# Patient Record
Sex: FEMALE | Race: WHITE | Employment: FULL TIME | URBAN - METROPOLITAN AREA
[De-identification: names, ages, dates, MRNs, and addresses within clinical notes are randomized per-mention and may not be internally consistent; named-entity substitution may affect disease eponyms.]

---

## 2017-07-20 NOTE — PROGRESS NOTES
SUBJECTIVE:                                                    Padmaja Gao is a 41 year old female who presents to clinic today for the following health issues:    Sore Throat      Duration: 2 weeks    Description  Right sided throat pain, ear pain, nasal congestion, nasal drainage at times, eye is watering on right side     Severity: moderate    Accompanying signs and symptoms:     History (predisposing factors):  Had strep about 1 yr ago  - per pt this feels different     Precipitating or alleviating factors: None    Therapies tried and outcome:  Allegra - helped.   Right nasal congestion.     Problem list and histories reviewed & adjusted, as indicated.  Additional history: as documented    Patient Active Problem List   Diagnosis     Morbid obesity (H)     Dyspnea and respiratory abnormality     Esophageal reflux     Low back pain     Intertrigo     KARL (obstructive sleep apnea)-mild (AHI 7)     Past Surgical History:   Procedure Laterality Date     EXTRACORPOREAL SHOCK WAVE LITHOTRIPSY (ESWL)       LAPAROSCOPIC GASTRIC SLEEVE  12/5/2013    Procedure: LAPAROSCOPIC GASTRIC SLEEVE;  LAPAROSCOPIC SLEEVE GASTRECTOMY ;  Surgeon: Roni Pina MD;  Location:  OR     LITHOTRIPSY       [place] [address]       Social History   Substance Use Topics     Smoking status: Never Smoker     Smokeless tobacco: Never Used     Alcohol use Yes     Family History   Problem Relation Age of Onset     Other - See Comments Paternal Grandmother      KARL     Hypertension Father          Current Outpatient Prescriptions   Medication Sig Dispense Refill     amoxicillin (AMOXIL) 500 MG capsule Take 2 capsules (1,000 mg) by mouth 3 times daily 60 capsule 0     levonorgestrel (MIRENA) 20 MCG/24HR IUD 1 each by Intrauterine route once.       CYANOCOBALAMIN PO Take 1,000 mcg by mouth daily       cyanocobalamin (VITAMIN B12) 1000 MCG/ML injection Inject 1 mL (1,000 mcg) Subcutaneous every 30 days (Patient not taking: Reported on  "7/21/2017) 3 mL 3     ferrous fumarate 65 mg, [place]. FE,-Vitamin C 125 mg (VITRON C)  MG TABS Take 1 tablet by mouth daily (Patient not taking: Reported on 7/21/2017) 90 tablet 3     Multiple Vitamins-Iron (QC DAILY MULTIVITAMINS/IRON) TABS Take 2 tablets by mouth daily (Patient not taking: Reported on 7/21/2017) 180 tablet 3     Acetaminophen (TYLENOL PO) Take 650 mg by mouth       Iron-Vitamin C (VITRON-C PO) Take 1 tablet by mouth        Cholecalciferol (VITAMIN D) 2000 UNITS CAPS Take 1 tablet by mouth daily       syringe/needle, disp, 25G X 1\" 3 ML MISC Use for B-12 injection (Patient not taking: Reported on 7/21/2017) 3 each 3     cyanocobalamin 1000 MCG/ML injection Inject 1 mL (1,000 mcg) Subcutaneous every 30 days (Patient not taking: Reported on 7/21/2017) 3 mL 3     Calcium-Vitamin D-Vitamin K (CALCIUM SOFT CHEWS PO) Take 600 mg by mouth 2 times daily (before meals)       Fexofenadine HCl (ALLEGRA ALLERGY PO) Take 180 mg by mouth daily       Allergies   Allergen Reactions     Sulfa Drugs Other (See Comments)     Unknown          ROS:  Constitutional, HEENT, cardiovascular, pulmonary, gi and gu systems are negative, except as otherwise noted.      OBJECTIVE:   /67  Pulse 64  Temp 98.3  F (36.8  C) (Oral)  Wt 233 lb (105.7 kg)  SpO2 99%  BMI 38.77 kg/m2  Body mass index is 38.77 kg/(m^2).  GENERAL: healthy, alert and no distress  Head: Normocephalic, atraumatic.  Eyes: Conjunctiva clear, non icteric. PERRLA.  Ears: External ears and TMs normal BL.  Nasal congestion with posterior nasal drainage. Right maxillary tenderness.   Mouth / Throat: Normal dentition.  No oral lesions. Pharynx non erythematous, tonsils without hypertrophy.  Neck: Supple, no enlarged LN, trachea midline.   RESP: lungs clear to auscultation - no rales, rhonchi or wheezes  CV: regular rate and rhythm, normal S1 S2, no S3 or S4, no murmur, click or rub, no peripheral edema     Diagnostic Test Results:  none "     ASSESSMENT/PLAN:       ICD-10-CM    1. Other sinusitis J32.9 amoxicillin (AMOXIL) 500 MG capsule   Warning signs discussed.  side effects discussed  Symptomatic treatment: such as fluids,  OTC acetaminophen and /or non-steroidal anti-inflammatory medication.  Follow up  1-2 wks as needed     Esa Holley PA-C  [facility]

## 2017-07-21 ENCOUNTER — OFFICE VISIT (OUTPATIENT)
Dept: FAMILY MEDICINE | Facility: CLINIC | Age: 42
End: 2017-07-21
Payer: COMMERCIAL

## 2017-07-21 VITALS
HEART RATE: 64 BPM | DIASTOLIC BLOOD PRESSURE: 67 MMHG | TEMPERATURE: 98.3 F | OXYGEN SATURATION: 99 % | BODY MASS INDEX: 38.77 KG/M2 | WEIGHT: 233 LBS | SYSTOLIC BLOOD PRESSURE: 102 MMHG

## 2017-07-21 DIAGNOSIS — J32.9 OTHER SINUSITIS: Primary | ICD-10-CM

## 2017-07-21 PROCEDURE — 99213 OFFICE O/P EST LOW 20 MIN: CPT | Performed by: PHYSICIAN ASSISTANT

## 2017-07-21 RX ORDER — AMOXICILLIN 500 MG/1
1000 CAPSULE ORAL 3 TIMES DAILY
Qty: 60 CAPSULE | Refills: 0 | Status: SHIPPED | OUTPATIENT
Start: 2017-07-21 | End: 2018-03-06

## 2017-07-21 NOTE — MR AVS SNAPSHOT
"              After Visit Summary   2017    Padmaja Gao    MRN: [id number]           Patient Information     Date Of Birth          1975        Visit Information        Provider Department      2017 9:20 AM Esa Holley PA-C [facility]        Today's Diagnoses     Other sinusitis    -  1       Follow-ups after your visit        Who to contact     If you have questions or need follow up information about today's clinic visit or your schedule please contact [facility] directly at [phone].  Normal or non-critical lab and imaging results will be communicated to you by Zeerhart, letter or phone within 4 business days after the clinic has received the results. If you do not hear from us within 7 days, please contact the clinic through Zeerhart or phone. If you have a critical or abnormal lab result, we will notify you by phone as soon as possible.  Submit refill requests through Occlutech or call your pharmacy and they will forward the refill request to us. Please allow 3 business days for your refill to be completed.          Additional Information About Your Visit        MyChart Information     Occlutech lets you send messages to your doctor, view your test results, renew your prescriptions, schedule appointments and more. To sign up, go to [email or web address] . Click on \"Log in\" on the left side of the screen, which will take you to the Welcome page. Then click on \"Sign up Now\" on the right side of the page.     You will be asked to enter the access code listed below, as well as some personal information. Please follow the directions to create your username and password.     Your access code is: [email or web address]  Expires: 10/19/2017  9:34 AM     Your access code will  in 90 days. If you need help or a new code, please call your [facility] or [phone].        Care EveryWhere ID     This is your Care EveryWhere ID. This could be used by other " organizations to access your [facility] medical records  [id number]        Your Vitals Were     Pulse Temperature Pulse Oximetry BMI (Body Mass Index)          64 98.3  F (36.8  C) (Oral) 99% 38.77 kg/m2         Blood Pressure from Last 3 Encounters:   07/21/17 102/67   12/11/15 113/72   12/21/14 116/70    Weight from Last 3 Encounters:   07/21/17 233 lb (105.7 kg)   12/11/15 201 lb 14.4 oz (91.6 kg)   12/21/14 201 lb (91.2 kg)              Today, you had the following     No orders found for display         Today's Medication Changes          These changes are accurate as of: 7/21/17  9:34 AM.  If you have any questions, ask your nurse or doctor.               Start taking these medicines.        Dose/Directions    amoxicillin 500 MG capsule   Commonly known as:  AMOXIL   Used for:  Other sinusitis   Started by:  Esa Holley PA-C        Dose:  1000 mg   Take 2 capsules (1,000 mg) by mouth 3 times daily   Quantity:  60 capsule   Refills:  0            Where to get your medicines      These medications were sent to [facility] [place] - [address] [place] [address]  [address] [place] [address], [place] [address]     Phone:  [phone]     amoxicillin 500 MG capsule                Primary Care Provider Office Phone # Fax #    [facility] [phone]       [address] [place] [address]        Equal Access to Services     TANNER WOODALL AH: Monster veronicao Sozuri, waaxda luqadaha, qaybta kaalmada brigid, waxjin vern turner ana luisaaustin cmnamara. So [facility] [phone].    ATENCIÓN: Si habla español, tiene a vu disposición servicios gratuitos de asistencia lingüística. Kim al [phone].    We comply with applicable federal civil rights laws and [place] laws. We do not discriminate on the basis of race, color, national origin, age, disability sex, sexual orientation or gender identity.            Thank you!     Thank you for choosing  "[facility]  for your care. Our goal is always to provide you with excellent care. Hearing back from our patients is one way we can continue to improve our services. Please take a few minutes to complete the written survey that you may receive in the mail after your visit with us. Thank you!             Your Updated Medication List - Protect others around you: Learn how to safely use, store and throw away your medicines at www.disposemymeds.org.          This list is accurate as of: 7/21/17  9:34 AM.  Always use your most recent med list.                   Brand Name Dispense Instructions for use Diagnosis    ALLEGRA ALLERGY PO      Take 180 mg by mouth daily        amoxicillin 500 MG capsule    AMOXIL    60 capsule    Take 2 capsules (1,000 mg) by mouth 3 times daily    Other sinusitis       CALCIUM SOFT CHEWS PO      Take 600 mg by mouth 2 times daily (before meals)        * CYANOCOBALAMIN PO      Take 1,000 mcg by mouth daily    Bariatric surgery status, Obesity (BMI 30-39.9)       * cyanocobalamin 1000 MCG/ML injection    VITAMIN B12    3 mL    Inject 1 mL (1,000 mcg) Subcutaneous every 30 days    Other and unspecified postsurgical nonabsorption       * cyanocobalamin 1000 MCG/ML injection    VITAMIN B12    3 mL    Inject 1 mL (1,000 mcg) Subcutaneous every 30 days    Bariatric surgery status, Postsurgical nonabsorption       MIRENA (52 MG) 20 MCG/24HR IUD   Generic drug:  levonorgestrel      1 each by Intrauterine route once.        QC DAILY MULTIVITAMINS/IRON Tabs     180 tablet    Take 2 tablets by mouth daily    Bariatric surgery status, Postsurgical nonabsorption       syringe/needle (disp) 25G X 1\" 3 ML Misc     3 each    Use for B-12 injection    Other and unspecified postsurgical nonabsorption       TYLENOL PO      Take 650 mg by mouth        vitamin D 2000 UNITS Caps      Take 1 tablet by mouth daily        * VITRON-C PO      Take 1 tablet by mouth        * ferrous fumarate 65 mg ([place]. " FE)-Vitamin C 125 mg  MG Tabs tablet    VITRON C    90 tablet    Take 1 tablet by mouth daily    Bariatric surgery status, Postsurgical nonabsorption       * Notice:  This list has 5 medication(s) that are the same as other medications prescribed for you. Read the directions carefully, and ask your doctor or other care provider to review them with you.

## 2017-07-21 NOTE — NURSING NOTE
"Chief Complaint   Patient presents with     Pharyngitis       Initial /67  Pulse 64  Temp 98.3  F (36.8  C) (Oral)  Wt 233 lb (105.7 kg)  SpO2 99%  BMI 38.77 kg/m2 Estimated body mass index is 38.77 kg/(m^2) as calculated from the following:    Height as of 12/21/14: 5' 5\" (1.651 m).    Weight as of this encounter: 233 lb (105.7 kg).  Medication Reconciliation: complete  Chaya Ponce CMA    "

## 2017-11-17 ENCOUNTER — RADIANT APPOINTMENT (OUTPATIENT)
Dept: GENERAL RADIOLOGY | Facility: CLINIC | Age: 42
End: 2017-11-17
Attending: PODIATRIST
Payer: COMMERCIAL

## 2017-11-17 ENCOUNTER — OFFICE VISIT (OUTPATIENT)
Dept: PODIATRY | Facility: CLINIC | Age: 42
End: 2017-11-17
Payer: COMMERCIAL

## 2017-11-17 VITALS — HEIGHT: 66 IN | WEIGHT: 234 LBS | TEMPERATURE: 97.6 F | BODY MASS INDEX: 37.61 KG/M2

## 2017-11-17 DIAGNOSIS — M12.571 TRAUMATIC ARTHRITIS OF RIGHT FOOT: ICD-10-CM

## 2017-11-17 DIAGNOSIS — M72.2 PLANTAR FASCIAL FIBROMATOSIS: Primary | ICD-10-CM

## 2017-11-17 PROCEDURE — 73630 X-RAY EXAM OF FOOT: CPT | Mod: TC

## 2017-11-17 PROCEDURE — 99204 OFFICE O/P NEW MOD 45 MIN: CPT | Performed by: PODIATRIST

## 2017-11-17 PROCEDURE — 73650 X-RAY EXAM OF HEEL: CPT | Mod: TC

## 2017-11-17 RX ORDER — METHYLPREDNISOLONE 4 MG
TABLET, DOSE PACK ORAL
Qty: 21 TABLET | Refills: 0 | Status: SHIPPED | OUTPATIENT
Start: 2017-11-17 | End: 2018-01-02

## 2017-11-17 RX ORDER — DICLOFENAC SODIUM 75 MG/1
75 TABLET, DELAYED RELEASE ORAL 2 TIMES DAILY
Qty: 60 TABLET | Refills: 1 | Status: SHIPPED | OUTPATIENT
Start: 2017-11-17 | End: 2017-11-17

## 2017-11-17 ASSESSMENT — PAIN SCALES - GENERAL: PAINLEVEL: MODERATE PAIN (5)

## 2017-11-17 NOTE — PATIENT INSTRUCTIONS
PLANTAR FASCIITIS  The  plantar fascia  is a thick fibrous layer of tissue that covers the bones on the bottom of your foot. It supports the foot bones in an arched position.  Plantar fasciitis  is a painful inflammation of the plantar fascia due to overuse. This can develop gradually or suddenly. It usually affects one foot at a time but can affect both feet. Heel pain can be sharp and feel like a knife sticking in the bottom of your foot. Pain may occur after exercising, long distance jogging, stair climbing, long periods of standing, or after getting up from a seated position.  Risk factors include arthritis, diabetes, obesity or recent weight gain, flat-foot, high arch, wearing high heels or loose shoes or shoes with poor arch support.  Sudden changes in activity or shoe gear may contribute to symptoms.  Foot pain from this condition is usually worse in the morning and improves with walking. By the end of the day there may be a dull aching. Treatment requires improved support of feet, short-term rest and controlling inflammation. It may take up to nine months before all symptoms go away with the measures described below.  A steroid injection into the foot, or surgery may be needed if this is becomes long standing or severe.  HOME CARE  1. If you are overweight, lose weight to promote healing.  2. Choose supportive shoes (stiff through the shank) with good arch support and shock absorbency. Replace athletic shoes when they become worn out. Don t walk or run barefoot.  3. Shoe inserts are an important part of treatment. You can buy off-the-shelf shoe inserts inexpensively such as 20linest.  The best ones are custom molded to your foot with a prescription.  4. Night splints keep the plantar fascia gently stretched while you sleep and will eliminate morning pain. Wear it ALL NIGHT EVERY NIGHT, or any time you sit for a long time.  5. Reduce by 10% or more the activities that stress the feet: jogging, prolonged  standing or walking, high impact sports, etc.  6. Stretch your feet. Gently flex your ankle by leaning into a wall or counter or drop your heel from a step.  Stretch two minutes of every hour you are awake.  7. Icing or massage may help heel pain. Apply an ice pack or frozen water or Coke bottle to the heel for 10-20 minutes as a preventive or after an acute flare of symptoms. You may repeat this as needed.   Follow up with your Doctor in 3 weeks as instructed.  .    Reliable shoe stores: To maximize your experience and provide the best possible fit.  Be sure to show them your foot concerns and tell them Dr. Gallego sent you.      Stores listed in bold have only athletic shoes, and stores that are not bold are mostly casual or variety of shoes    [employer]  [address] [place] [address]  [phone]     [facility]  [address] [place] [address]  [phone]     [employer] [place]  [address] [place] [address]  [phone]    [employer]  [address] [facility] [address]  [phone]    [employer]  [address] [place], [place] [address] [phone]    [employer]  [address] [place] [address]  [phone]                         [employer] Locations:     [address] [place] [address]   [phone]     [address] [place] [address]   [phone]     [address] [place] [address]   [phone]     [address] [place] [address]   [phone]     [address] [place] [address]   [phone]     [address] [place] [address]   [phone]     [address] [address]   [place] [address]   [phone]     [address]   [place], [place] [id number] [phone]             How to find reasonable shoes          The correct width    Correct Fitting    Correct Length      Foot Distortion    Posture Distortion                           Torsional Rigidity      Grasp behind the heel and underneath the foot and twist      Bad    Excessive torsion/twist in midfoot     Less torsion/twist in midfoot is better                   Heel Counter Rigidity      Grasp just above   midsole and squeeze      Bad    Soft heel counter      Good    Rigid Heel Counter      Flexion Rigidity      Grasp shoe and bend from forefoot to rearfoot

## 2017-11-17 NOTE — NURSING NOTE
"Chief Complaint   Patient presents with     Consult     Left heel pain, onset Sept 2017, dorsal Right foot pain due to injury as a teenager; new pt       Initial Temp 97.6  F (36.4  C) (Temporal)  Ht 5' 6\" (1.676 m)  Wt 234 lb (106.1 kg)  BMI 37.77 kg/m2 Estimated body mass index is 37.77 kg/(m^2) as calculated from the following:    Height as of this encounter: 5' 6\" (1.676 m).    Weight as of this encounter: 234 lb (106.1 kg).  BP completed using cuff size: NA (Not Taken)  Medication Reconciliation: complete    Aby Valentine CMA, November 17, 2017    "

## 2017-11-17 NOTE — PROGRESS NOTES
"HPI:  Padmaja Gao is a 42 year old female who is seen in consultation at the request of self.    Pt presents for eval of:   (Onset, Location, L/R, Character, Treatments, Injury if yes)    XR Right foot and Left calcaneal today, 11/17/2017 Sept 2017 plantar Left heel pain. About a year ago Right heel but this is intermittent now.  Sharp, stabbing, burning, shooting, throbbing, tightness after lying or sitting, pain 5-10  Right dorsal foot pain since fracture when patient was a teenager.  Massage, ice, stretching, OTC inserts    Works at [employer], .    Weight management plan: Patient was referred to their PCP to discuss a diet and exercise plan.     ROS: 10 point ROS neg other than the symptoms noted above in the HPI.    PAST MEDICAL HISTORY:   Past Medical History:   Diagnosis Date     Cholelithiasis      GERD (gastroesophageal reflux disease)      Nephrolithiasis      Obesity      KARL (obstructive sleep apnea)-mild (AHI 7) 7/15/2013    Indications for Polysomnogram 7/10/2013: The patient is a 37 y year old Female who is 5' 5\" and weighs 277.1 lbs.  Her BMI equals 46.2, [place] sleepiness scale 15.0. A full night polysomnogram was performed to evaluate for excessive sleepiness, snoring, and possible KARL  Polysomnogram Data:  A full night polysomnogram recorded the standard physiologic parameters including EEG, EOG, EMG, EKG, nasa     Seasonal allergies      PAST SURGICAL HISTORY:   Past Surgical History:   Procedure Laterality Date     EXTRACORPOREAL SHOCK WAVE LITHOTRIPSY (ESWL)       LAPAROSCOPIC GASTRIC SLEEVE  12/5/2013    Procedure: LAPAROSCOPIC GASTRIC SLEEVE;  LAPAROSCOPIC SLEEVE GASTRECTOMY ;  Surgeon: Roni Pina MD;  Location:  OR     LITHOTRIPSY       [facility]      x1     MEDICATIONS:   Current Outpatient Prescriptions:      methylPREDNISolone (MEDROL DOSEPAK) 4 MG tablet, follow package directions, Disp: 21 tablet, Rfl: 0     levonorgestrel (MIRENA) 20 " "MCG/24HR IUD, 1 each by Intrauterine route once., Disp: , Rfl:      amoxicillin (AMOXIL) 500 MG capsule, Take 2 capsules (1,000 mg) by mouth 3 times daily, Disp: 60 capsule, Rfl: 0     CYANOCOBALAMIN PO, Take 1,000 mcg by mouth daily, Disp: , Rfl:      cyanocobalamin (VITAMIN B12) 1000 MCG/ML injection, Inject 1 mL (1,000 mcg) Subcutaneous every 30 days (Patient not taking: Reported on 7/21/2017), Disp: 3 mL, Rfl: 3     ferrous fumarate 65 mg, [place]. FE,-Vitamin C 125 mg (VITRON C)  MG TABS, Take 1 tablet by mouth daily (Patient not taking: Reported on 7/21/2017), Disp: 90 tablet, Rfl: 3     Multiple Vitamins-Iron (QC DAILY MULTIVITAMINS/IRON) TABS, Take 2 tablets by mouth daily (Patient not taking: Reported on 7/21/2017), Disp: 180 tablet, Rfl: 3     Acetaminophen (TYLENOL PO), Take 650 mg by mouth, Disp: , Rfl:      Iron-Vitamin C (VITRON-C PO), Take 1 tablet by mouth , Disp: , Rfl:      Cholecalciferol (VITAMIN D) 2000 UNITS CAPS, Take 1 tablet by mouth daily, Disp: , Rfl:      syringe/needle, disp, 25G X 1\" 3 ML MISC, Use for B-12 injection (Patient not taking: Reported on 7/21/2017), Disp: 3 each, Rfl: 3     cyanocobalamin 1000 MCG/ML injection, Inject 1 mL (1,000 mcg) Subcutaneous every 30 days (Patient not taking: Reported on 7/21/2017), Disp: 3 mL, Rfl: 3     Calcium-Vitamin D-Vitamin K (CALCIUM SOFT CHEWS PO), Take 600 mg by mouth 2 times daily (before meals), Disp: , Rfl:      Fexofenadine HCl (ALLEGRA ALLERGY PO), Take 180 mg by mouth daily, Disp: , Rfl:   ALLERGIES:    Allergies   Allergen Reactions     Sulfa Drugs Other (See Comments)     Unknown      SOCIAL HISTORY:   Social History     Social History     Marital status:      Spouse name: N/A     Number of children: N/A     Years of education: N/A     Occupational History     Not on file.     Social History Main Topics     Smoking status: Never Smoker     Smokeless tobacco: Never Used     Alcohol use Yes     Drug use: No     Sexual " "activity: Yes     Partners: Male     Other Topics Concern     Caffeine Concern No     stopped secondary top surgery      Sleep Concern Yes     Weight Concern Yes     lap band      Social History Narrative     FAMILY HISTORY:   Family History   Problem Relation Age of Onset     Other - See Comments Paternal Grandmother      KARL     Hypertension Father        EXAM:Vitals: Temp 97.6  F (36.4  C) (Temporal)  Ht 5' 6\" (1.676 m)  Wt 234 lb (106.1 kg)  BMI 37.77 kg/m2  BMI= Body mass index is 37.77 kg/(m^2).    General appearance: Patient is alert and fully cooperative with history & exam.  No sign of distress is noted during the visit.     Psychiatric: Affect is pleasant & appropriate.  Patient appears motivated to improve health.     Respiratory: Breathing is regular & unlabored while sitting.     HEENT: Hearing is intact to spoken word.  Speech is clear.  No gross evidence of visual impairment that would impact ambulation.     Vascular: DP & PT 2/4 & regular bilaterally.  No significant edema, rubor or varicosities noted.  CFT and skin temperature is normal to both lower extremities.       Neurologic: Lower extremity sensation is intact to light touch.  No evidence of weakness in the lower extremities.  No evidence of neuropathy and negative tinel sign.     Dermatologic: Skin is intact to both lower extremities without significant lesions, rash or abrasion.  Normal texture turgor and tone. No paronychia or evidence of soft tissue infection is noted.    Musculoskeletal: Patient is ambulatory without assistive device or brace. Pain is noted with firm palpation along the medial band of the plantar fascia bilateral foot most notably at the origination upon the calcaneus not through the arch.  No pain with compression of the calcaneus medial to lateral or with palpation of the achilles, peroneal or posterior tibial tendons.  Slightly more than 0  of ankle joint dorsiflexion without crepitus or pain throughout the ankle, " subtalar or midtarsal joints.  No pain or limitations throughout manual muscle strength testing plus 5/5 to all four quadrants bilateral.  No palpable edema noted.      Also a bulky prominence noted over the dorsal first metatarsocuneiform joint right foot limited motion and crepitus through range of motion through the metatarsal tarsal joints on the right foot.    Radiographs:  Weight bearing. Osteophyte noted about the plantar calcaneus consistent with plantar fasciitis. Diminished calcaneal inclination angle consistent with pes valgus. Also joint space narrowing about the metatarsal tarsal joints in the right foot.     ASSESSMENT:       ICD-10-CM    1. Plantar fascial fibromatosis M72.2 XR Calcaneus Left G/E 2 Views     ORTHOTICS REFERRAL     methylPREDNISolone (MEDROL DOSEPAK) 4 MG tablet     DISCONTINUED: diclofenac (VOLTAREN) 75 MG EC tablet   2. 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      Treatments:  11/17/2017  Discontinue barefoot walking or unsupported walking in shoes without shank.  Dispensed written instructions to obtain appropriate shoe gear and/or OTC inserts.  Dispensed anterior night splint to use all night every night.  Prescription oral medrol for a short course as she cannot tolerate NSAIDs, gastric bypass history. Discussed risks.  Prescription for custom molded orthotics 11/17/2017    Follow up in 4-5 weeks     Also discussed the traumatic arthritis of the right midfoot in the deformity noted here. We discussed what to expect over time and how to treat this. Recommended a short course of oral Medrol are represcribed as well as custom molded orthotics. Reviewed appropriate shoe gear over time and discussed long-term treatment such as fusions of the midfoot.    approximately 45  minutes of time was spent with the patient, greater than 50% directly with the patient, counseling, educating, and coordinating care in regards to the above noted multiple diagnoses.       Jermaine Gallego DPM

## 2017-11-17 NOTE — NURSING NOTE
Dispensed 1 Dorsal (Anterior) Night Splint, Size S/M, with [facility] agreement signed by patient. Aby Valentine CMA, November 17, 2017

## 2017-11-17 NOTE — MR AVS SNAPSHOT
After Visit Summary   11/17/2017    Padmaja Gao    MRN: [id number]           Patient Information     Date Of Birth          1975        Visit Information        Provider Department      11/17/2017 8:45 AM Jermaine Gallego DPM [facility]        Today's Diagnoses     Plantar fascial fibromatosis    -  1    Traumatic arthritis of right foot          Care Instructions    PLANTAR FASCIITIS  The  plantar fascia  is a thick fibrous layer of tissue that covers the bones on the bottom of your foot. It supports the foot bones in an arched position.  Plantar fasciitis  is a painful inflammation of the plantar fascia due to overuse. This can develop gradually or suddenly. It usually affects one foot at a time but can affect both feet. Heel pain can be sharp and feel like a knife sticking in the bottom of your foot. Pain may occur after exercising, long distance jogging, stair climbing, long periods of standing, or after getting up from a seated position.  Risk factors include arthritis, diabetes, obesity or recent weight gain, flat-foot, high arch, wearing high heels or loose shoes or shoes with poor arch support.  Sudden changes in activity or shoe gear may contribute to symptoms.  Foot pain from this condition is usually worse in the morning and improves with walking. By the end of the day there may be a dull aching. Treatment requires improved support of feet, short-term rest and controlling inflammation. It may take up to nine months before all symptoms go away with the measures described below.  A steroid injection into the foot, or surgery may be needed if this is becomes long standing or severe.  HOME CARE  1. If you are overweight, lose weight to promote healing.  2. Choose supportive shoes (stiff through the shank) with good arch support and shock absorbency. Replace athletic shoes when they become worn out. Don t walk or run barefoot.  3. Shoe inserts are an important part  of treatment. You can buy off-the-shelf shoe inserts inexpensively such as Superfeet.  The best ones are custom molded to your foot with a prescription.  4. Night splints keep the plantar fascia gently stretched while you sleep and will eliminate morning pain. Wear it ALL NIGHT EVERY NIGHT, or any time you sit for a long time.  5. Reduce by 10% or more the activities that stress the feet: jogging, prolonged standing or walking, high impact sports, etc.  6. Stretch your feet. Gently flex your ankle by leaning into a wall or counter or drop your heel from a step.  Stretch two minutes of every hour you are awake.  7. Icing or massage may help heel pain. Apply an ice pack or frozen water or Coke bottle to the heel for 10-20 minutes as a preventive or after an acute flare of symptoms. You may repeat this as needed.   Follow up with your Doctor in 3 weeks as instructed.  .    Reliable shoe stores: To maximize your experience and provide the best possible fit.  Be sure to show them your foot concerns and tell them Dr. Gallego sent you.      Stores listed in bold have only athletic shoes, and stores that are not bold are mostly casual or variety of shoes    [employer]  [address] [place] [address]  [phone]     [employer]  [address] [place] [address]  [phone]    [employer]  [address] [place] [address]  [phone]    [employer]  [address] [place] [address]  [phone]    [employer]  [address] [place], [place] [address] [phone]    [employer]  [address] [place] [address]  [phone]                         [employer] Locations:     [address] [place] [address]   [phone]     [address] [place] [address] [address] [place] [address]   [phone] [address] [place] [address]   [phone]     [address]   [place]  "[address]   [phone]     [address] [facility] [place] [address]   [phone]     [address] [facility] [place] [address]   [phone]     [address] [facility]   [place] [address]   [phone]     [address]   [place] [address]   [phone]             How to find reasonable shoes          The correct width    Correct Fitting    Correct Length      Foot Distortion    Posture Distortion                          Torsional Rigidity      Grasp behind the heel and underneath the foot and twist      Bad    Excessive torsion/twist in midfoot     Less torsion/twist in midfoot is better                   Heel Counter Rigidity      Grasp just above   midsole and squeeze      Bad    Soft heel counter      Good    Rigid Heel Counter      Flexion Rigidity      Grasp shoe and bend from forefoot to rearfoot                        Follow-ups after your visit        Additional Services     ORTHOTICS REFERRAL       [facility] scheduling staff may contact patient to arrange appointments for casting of orthotics and often do not leave messages.  The patient may call this number for scheduling at their convenience. Scheduling Phone [phone].      One pair custom functional foot orthotics.   Flexible polypropylene shell with 1/8\" Spenco cushioned top cover, crepe rearfoot post, medial density arch fill, RENO bottom cover.  Aerobic model.                  Who to contact     If you have questions or need follow up information about today's clinic visit or your schedule please contact [facility] directly at [phone].  Normal or non-critical lab and imaging results will be communicated to you by MyChart, letter or phone within 4 business days after the clinic has received the results. If you do not hear from us within 7 days, please contact the clinic through MyChart or phone. If you have a critical or abnormal lab result, we will notify you by phone as soon as possible.  Submit " "refill requests through [employer] or call your pharmacy and they will forward the refill request to us. Please allow 3 business days for your refill to be completed.          Additional Information About Your Visit        [employer] Information     [employer] lets you send messages to your doctor, view your test results, renew your prescriptions, schedule appointments and more. To sign up, go to [email or web address] . Click on \"Log in\" on the left side of the screen, which will take you to the Welcome page. Then click on \"Sign up Now\" on the right side of the page.     You will be asked to enter the access code listed below, as well as some personal information. Please follow the directions to create your username and password.     Your access code is: [email or web address]  Expires: 2/15/2018  9:47 AM     Your access code will  in 90 days. If you need help or a new code, please call your [place] clinic or [phone].        Care EveryWhere ID     This is your Care EveryWhere ID. This could be used by other organizations to access your [place] medical records  [id number]        Your Vitals Were     Temperature Height BMI (Body Mass Index)             97.6  F (36.4  C) (Temporal) 5' 6\" (1.676 m) 37.77 kg/m2          Blood Pressure from Last 3 Encounters:   17 102/67   12/11/15 113/72   14 116/70    Weight from Last 3 Encounters:   17 234 lb (106.1 kg)   17 233 lb (105.7 kg)   12/11/15 201 lb 14.4 oz (91.6 kg)              We Performed the Following     ORTHOTICS REFERRAL     XR Calcaneus Left G/E 2 Views     XR Foot Right G/E 3 Views          Today's Medication Changes          These changes are accurate as of: 17  9:47 AM.  If you have any questions, ask your nurse or doctor.               Start taking these medicines.        Dose/Directions    methylPREDNISolone 4 MG tablet   Commonly known as:  MEDROL DOSEPAK   Used for:  Plantar fascial fibromatosis, Traumatic arthritis of right foot "   Started by:  Jermaine Gallego DPM        follow package directions   Quantity:  21 tablet   Refills:  0            Where to get your medicines      These medications were sent to [facility] [place] [place] - [address]  [address], [place] [address]     Phone:  [phone]     methylPREDNISolone 4 MG tablet                Primary Care Provider Office Phone # Fax #    [facility] [phone]       [address] [place] [address]        Equal Access to Services     [facility]: Hadii aad ku hadasho Soomaali, waaxda luqadaha, qaybta kaalmada adeegyada, waxay idiin hayaan sierra novak . So [facility] [phone].    ATENCIÓN: Si habla español, tiene a vu disposición servicios gratuitos de asistencia lingüística. [facility] [phone].    We comply with applicable federal civil rights laws and [place] laws. We do not discriminate on the basis of race, color, national origin, age, disability, sex, sexual orientation, or gender identity.            Thank you!     Thank you for choosing [facility]  for your care. Our goal is always to provide you with excellent care. Hearing back from our patients is one way we can continue to improve our services. Please take a few minutes to complete the written survey that you may receive in the mail after your visit with us. Thank you!             Your Updated Medication List - Protect others around you: Learn how to safely use, store and throw away your medicines at www.disposemymeds.org.          This list is accurate as of: 11/17/17  9:47 AM.  Always use your most recent med list.                   Brand Name Dispense Instructions for use Diagnosis    ALLEGRA ALLERGY PO      Take 180 mg by mouth daily        amoxicillin 500 MG capsule    AMOXIL    60 capsule    Take 2 capsules (1,000 mg) by mouth 3 times daily    Other sinusitis       CALCIUM SOFT CHEWS PO       "Take 600 mg by mouth 2 times daily (before meals)        * CYANOCOBALAMIN PO      Take 1,000 mcg by mouth daily    Bariatric surgery status, Obesity (BMI 30-39.9)       * cyanocobalamin 1000 MCG/ML injection    VITAMIN B12    3 mL    Inject 1 mL (1,000 mcg) Subcutaneous every 30 days    Other and unspecified postsurgical nonabsorption       * cyanocobalamin 1000 MCG/ML injection    VITAMIN B12    3 mL    Inject 1 mL (1,000 mcg) Subcutaneous every 30 days    Bariatric surgery status, Postsurgical nonabsorption       methylPREDNISolone 4 MG tablet    MEDROL DOSEPAK    21 tablet    follow package directions    Plantar fascial fibromatosis, Traumatic arthritis of right foot       MIRENA (52 MG) 20 MCG/24HR IUD   Generic drug:  levonorgestrel      1 each by Intrauterine route once.        QC DAILY MULTIVITAMINS/IRON Tabs     180 tablet    Take 2 tablets by mouth daily    Bariatric surgery status, Postsurgical nonabsorption       syringe/needle (disp) 25G X 1\" 3 ML Misc     3 each    Use for B-12 injection    Other and unspecified postsurgical nonabsorption       TYLENOL PO      Take 650 mg by mouth        vitamin D 2000 UNITS Caps      Take 1 tablet by mouth daily        * VITRON-C PO      Take 1 tablet by mouth        * ferrous fumarate 65 mg ([place]. FE)-Vitamin C 125 mg  MG Tabs tablet    VITRON C    90 tablet    Take 1 tablet by mouth daily    Bariatric surgery status, Postsurgical nonabsorption       * Notice:  This list has 5 medication(s) that are the same as other medications prescribed for you. Read the directions carefully, and ask your doctor or other care provider to review them with you.      "

## 2018-01-02 ENCOUNTER — OFFICE VISIT (OUTPATIENT)
Dept: PODIATRY | Facility: OTHER | Age: 43
End: 2018-01-02
Payer: COMMERCIAL

## 2018-01-02 VITALS — BODY MASS INDEX: 37.93 KG/M2 | HEIGHT: 66 IN | WEIGHT: 236 LBS | TEMPERATURE: 97.5 F

## 2018-01-02 DIAGNOSIS — M72.2 PLANTAR FASCIAL FIBROMATOSIS: Primary | ICD-10-CM

## 2018-01-02 PROCEDURE — 20550 NJX 1 TENDON SHEATH/LIGAMENT: CPT | Mod: LT | Performed by: PODIATRIST

## 2018-01-02 RX ORDER — METHYLPREDNISOLONE 4 MG
TABLET, DOSE PACK ORAL
Qty: 21 TABLET | Refills: 0 | Status: SHIPPED | OUTPATIENT
Start: 2018-01-02 | End: 2018-03-06

## 2018-01-02 ASSESSMENT — PAIN SCALES - GENERAL: PAINLEVEL: SEVERE PAIN (6)

## 2018-01-02 NOTE — NURSING NOTE
"Chief Complaint   Patient presents with     RECHECK     wearing NS, orthotics 1/5/2018, different shoes, pain 6, burning sens, relief from medrol dose pack for short time - Left heel plantar fasciitis, onset Sept 2017; LOV 11/17/2017       Initial Temp 97.5  F (36.4  C) (Temporal)  Ht 5' 6\" (1.676 m)  Wt 236 lb (107 kg)  BMI 38.09 kg/m2 Estimated body mass index is 38.09 kg/(m^2) as calculated from the following:    Height as of this encounter: 5' 6\" (1.676 m).    Weight as of this encounter: 236 lb (107 kg).  BP completed using cuff size: NA (Not Taken)  Medication Reconciliation: complete    Aby Valentine CMA, January 2, 2018    "

## 2018-01-02 NOTE — PATIENT INSTRUCTIONS
PLANTAR FASCIITIS  The  plantar fascia  is a thick fibrous layer of tissue that covers the bones on the bottom of your foot. It supports the foot bones in an arched position.  Plantar fasciitis  is a painful inflammation of the plantar fascia due to overuse. This can develop gradually or suddenly. It usually affects one foot at a time but can affect both feet. Heel pain can be sharp and feel like a knife sticking in the bottom of your foot. Pain may occur after exercising, long distance jogging, stair climbing, long periods of standing, or after getting up from a seated position.  Risk factors include arthritis, diabetes, obesity or recent weight gain, flat-foot, high arch, wearing high heels or loose shoes or shoes with poor arch support.  Sudden changes in activity or shoe gear may contribute to symptoms.  Foot pain from this condition is usually worse in the morning and improves with walking. By the end of the day there may be a dull aching. Treatment requires improved support of feet, short-term rest and controlling inflammation. It may take up to nine months before all symptoms go away with the measures described below.  A steroid injection into the foot, or surgery may be needed if this is becomes long standing or severe.  HOME CARE  1. If you are overweight, lose weight to promote healing.  2. Choose supportive shoes (stiff through the shank) with good arch support and shock absorbency. Replace athletic shoes when they become worn out. Don t walk or run barefoot.  3. Shoe inserts are an important part of treatment. You can buy off-the-shelf shoe inserts inexpensively such as [employer].  The best ones are custom molded to your foot with a prescription.  4. Night splints keep the plantar fascia gently stretched while you sleep and will eliminate morning pain. Wear it ALL NIGHT EVERY NIGHT, or any time you sit for a long time.  5. Reduce by 10% or more the activities that stress the feet: jogging, prolonged  standing or walking, high impact sports, etc.  6. Stretch your feet. Gently flex your ankle by leaning into a wall or counter or drop your heel from a step.  Stretch two minutes of every hour you are awake.  7. Icing or massage may help heel pain. Apply an ice pack or frozen water or Coke bottle to the heel for 10-20 minutes as a preventive or after an acute flare of symptoms. You may repeat this as needed.   Follow up with your Doctor in 3 weeks as instructed.

## 2018-01-02 NOTE — MR AVS SNAPSHOT
After Visit Summary   1/2/2018    Padmaja Gao    MRN: [id number]           Patient Information     Date Of Birth          1975        Visit Information        Provider Department      1/2/2018 3:15 PM Jermaine Gallego DPM [facility]'s Diagnoses     Plantar fascial fibromatosis    -  1      Care Instructions    PLANTAR FASCIITIS  The  plantar fascia  is a thick fibrous layer of tissue that covers the bones on the bottom of your foot. It supports the foot bones in an arched position.  Plantar fasciitis  is a painful inflammation of the plantar fascia due to overuse. This can develop gradually or suddenly. It usually affects one foot at a time but can affect both feet. Heel pain can be sharp and feel like a knife sticking in the bottom of your foot. Pain may occur after exercising, long distance jogging, stair climbing, long periods of standing, or after getting up from a seated position.  Risk factors include arthritis, diabetes, obesity or recent weight gain, flat-foot, high arch, wearing high heels or loose shoes or shoes with poor arch support.  Sudden changes in activity or shoe gear may contribute to symptoms.  Foot pain from this condition is usually worse in the morning and improves with walking. By the end of the day there may be a dull aching. Treatment requires improved support of feet, short-term rest and controlling inflammation. It may take up to nine months before all symptoms go away with the measures described below.  A steroid injection into the foot, or surgery may be needed if this is becomes long standing or severe.  HOME CARE  1. If you are overweight, lose weight to promote healing.  2. Choose supportive shoes (stiff through the shank) with good arch support and shock absorbency. Replace athletic shoes when they become worn out. Don t walk or run barefoot.  3. Shoe inserts are an important part of treatment. You can buy off-the-shelf shoe  inserts inexpensively such as Superfeet.  The best ones are custom molded to your foot with a prescription.  4. Night splints keep the plantar fascia gently stretched while you sleep and will eliminate morning pain. Wear it ALL NIGHT EVERY NIGHT, or any time you sit for a long time.  5. Reduce by 10% or more the activities that stress the feet: jogging, prolonged standing or walking, high impact sports, etc.  6. Stretch your feet. Gently flex your ankle by leaning into a wall or counter or drop your heel from a step.  Stretch two minutes of every hour you are awake.  7. Icing or massage may help heel pain. Apply an ice pack or frozen water or Coke bottle to the heel for 10-20 minutes as a preventive or after an acute flare of symptoms. You may repeat this as needed.   Follow up with your Doctor in 3 weeks as instructed.            Follow-ups after your visit        Your next 10 appointments already scheduled     Feb 13, 2018  3:15 PM CST   [facility]    [address] [facility] [address] [phone]              Who to contact     If you have questions or need follow up information about today's clinic visit or your schedule please contact [facility] directly at [phone].  Normal or non-critical lab and imaging results will be communicated to you by [employer], letter or phone within 4 business days after the clinic has received the results. If you do not hear from us within 7 days, please contact the clinic through [employer] or phone. If you have a critical or abnormal lab result, we will notify you by phone as soon as possible.  Submit refill requests through [employer] or call your pharmacy and they will forward the refill request to us. Please allow 3 business days for your refill to be completed.          Additional Information About Your Visit        Accredible Information     [employer] lets you send  "messages to your doctor, view your test results, renew your prescriptions, schedule appointments and more. To sign up, go to [email or web address] . Click on \"Log in\" on the left side of the screen, which will take you to the Welcome page. Then click on \"Sign up Now\" on the right side of the page.     You will be asked to enter the access code listed below, as well as some personal information. Please follow the directions to create your username and password.     Your access code is: [email or web address]  Expires: 2/15/2018  9:47 AM     Your access code will  in 90 days. If you need help or a new code, please call your [place] clinic or [phone].        Care EveryWhere ID     This is your Care EveryWhere ID. This could be used by other organizations to access your [place] medical records  [id number]        Your Vitals Were     Temperature Height BMI (Body Mass Index)             97.5  F (36.4  C) (Temporal) 5' 6\" (1.676 m) 38.09 kg/m2          Blood Pressure from Last 3 Encounters:   17 102/67   12/11/15 113/72   14 116/70    Weight from Last 3 Encounters:   18 236 lb (107 kg)   17 234 lb (106.1 kg)   17 233 lb (105.7 kg)              We Performed the Following     INJECTION SINGLE TENDON SHEATH/LIGAMENT     Kenalog 10 MG         []          Today's Medication Changes          These changes are accurate as of: 18  4:20 PM.  If you have any questions, ask your nurse or doctor.               Start taking these medicines.        Dose/Directions    methylPREDNISolone 4 MG tablet   Commonly known as:  MEDROL DOSEPAK   Used for:  Plantar fascial fibromatosis   Started by:  Jermaine Gallego DPM        follow package directions   Quantity:  21 tablet   Refills:  0       triamcinolone acetonide 10 MG/ML injection   Commonly known as:  KENALOG   Used for:  Plantar fascial fibromatosis   Started by:  Jermaine Gallego DPM        Dose:  10 mg   1 mL (10 mg) by " INTRA-ARTICULAR route once for 1 dose   Quantity:  1 mL   Refills:  0            Where to get your medicines      These medications were sent to [facility] [place] - [address]  [address], [place] [address]     Phone:  [phone]     methylPREDNISolone 4 MG tablet         Some of these will need a paper prescription and others can be bought over the counter.  Ask your nurse if you have questions.     You don't need a prescription for these medications     triamcinolone acetonide 10 MG/ML injection                Primary Care Provider Office Phone # Fax #    [facility] [phone]       [address] [place] [address]        Equal Access to Services     P[employer]I [employer] : [employer], waaxda keri, qaybta kaalmada brigid, maddy mcnamara. So [facility] [phone].    ATENCIÓN: Si habla español, tiene a vu disposición servicios gratuitos de asistencia lingüística. [facility] [phone].    We comply with applicable federal civil rights laws and [place] laws. We do not discriminate on the basis of race, color, national origin, age, disability, sex, sexual orientation, or gender identity.            Thank you!     Thank you for choosing [facility]  for your care. Our goal is always to provide you with excellent care. Hearing back from our patients is one way we can continue to improve our services. Please take a few minutes to complete the written survey that you may receive in the mail after your visit with us. Thank you!             Your Updated Medication List - Protect others around you: Learn how to safely use, store and throw away your medicines at www.disposemymeds.org.          This list is accurate as of: 1/2/18  4:20 PM.  Always use your most recent med list.                   Brand Name Dispense Instructions for use Diagnosis    amoxicillin 500 MG  capsule    AMOXIL    60 capsule    Take 2 capsules (1,000 mg) by mouth 3 times daily    Other sinusitis       methylPREDNISolone 4 MG tablet    MEDROL DOSEPAK    21 tablet    follow package directions    Plantar fascial fibromatosis       MIRENA (52 MG) 20 MCG/24HR IUD   Generic drug:  levonorgestrel      1 each by Intrauterine route once.        triamcinolone acetonide 10 MG/ML injection    KENALOG    1 mL    1 mL (10 mg) by INTRA-ARTICULAR route once for 1 dose    Plantar fascial fibromatosis       TYLENOL PO      Take 650 mg by mouth

## 2018-01-02 NOTE — PROGRESS NOTES
"Chief Complaint   Patient presents with     RECHECK     wearing NS, orthotics 1/5/2018, different shoes, pain 6, burning sens, relief from medrol dose pack for short time - Left heel plantar fasciitis, onset Sept 2017; LOV 11/17/2017       Weight management plan: Patient was referred to their PCP to discuss a diet and exercise plan.     HPI:  Padmaja Gao is a 42 year old female who is seen in consultation at the request of self.    Pt presents for eval of:   (Onset, Location, L/R, Character, Treatments, Injury if yes)    XR Right foot and Left calcaneal today, 11/17/2017 Sept 2017 plantar Left heel pain. About a year ago Right heel but this is intermittent now.  Sharp, stabbing, burning, shooting, throbbing, tightness after lying or sitting, pain 5-10  Right dorsal foot pain since fracture when patient was a teenager.  Massage, ice, stretching, OTC inserts    Works at [employer], .     ROS: 10 point ROS neg other than the symptoms noted above in the HPI.    PAST MEDICAL HISTORY:   Past Medical History:   Diagnosis Date     Cholelithiasis      GERD (gastroesophageal reflux disease)      Nephrolithiasis      Obesity      KARL (obstructive sleep apnea)-mild (AHI 7) 7/15/2013    Indications for Polysomnogram 7/10/2013: The patient is a 37 y year old Female who is 5' 5\" and weighs 277.1 lbs.  Her BMI equals 46.2, [place] sleepiness scale 15.0. A full night polysomnogram was performed to evaluate for excessive sleepiness, snoring, and possible KARL  Polysomnogram Data:  A full night polysomnogram recorded the standard physiologic parameters including EEG, EOG, EMG, EKG, nasa     Seasonal allergies      PAST SURGICAL HISTORY:   Past Surgical History:   Procedure Laterality Date     EXTRACORPOREAL SHOCK WAVE LITHOTRIPSY (ESWL)       LAPAROSCOPIC GASTRIC SLEEVE  12/5/2013    Procedure: LAPAROSCOPIC GASTRIC SLEEVE;  LAPAROSCOPIC SLEEVE GASTRECTOMY ;  Surgeon: Roni Pina MD;  Location:  OR " "    [facility]     MEDICATIONS:   Current Outpatient Prescriptions:      methylPREDNISolone (MEDROL DOSEPAK) 4 MG tablet, follow package directions, Disp: 21 tablet, Rfl: 0     levonorgestrel (MIRENA) 20 MCG/24HR IUD, 1 each by Intrauterine route once., Disp: , Rfl:      amoxicillin (AMOXIL) 500 MG capsule, Take 2 capsules (1,000 mg) by mouth 3 times daily, Disp: 60 capsule, Rfl: 0     Acetaminophen (TYLENOL PO), Take 650 mg by mouth, Disp: , Rfl:   ALLERGIES:    Allergies   Allergen Reactions     Sulfa Drugs Other (See Comments)     Unknown      SOCIAL HISTORY:   Social History     Social History     Marital status:      Spouse name: N/A     Number of children: N/A     Years of education: N/A     Occupational History     Not on file.     Social History Main Topics     Smoking status: Never Smoker     Smokeless tobacco: Never Used     Alcohol use Yes     Drug use: No     Sexual activity: Yes     Partners: Male     Other Topics Concern     Caffeine Concern No     stopped secondary top surgery      Sleep Concern Yes     Weight Concern Yes     lap band      Social History Narrative     FAMILY HISTORY:   Family History   Problem Relation Age of Onset     Other - See Comments Paternal Grandmother      KARL     Hypertension Father        EXAM:Vitals: Temp 97.5  F (36.4  C) (Temporal)  Ht 5' 6\" (1.676 m)  Wt 236 lb (107 kg)  BMI 38.09 kg/m2  BMI= Body mass index is 38.09 kg/(m^2).    General appearance: Patient is alert and fully cooperative with history & exam.  No sign of distress is noted during the visit.     Psychiatric: Affect is pleasant & appropriate.  Patient appears motivated to improve health.     Respiratory: Breathing is regular & unlabored while sitting.     HEENT: Hearing is intact to spoken word.  Speech is clear.  No gross evidence of visual impairment that would impact ambulation.     Vascular: DP & PT 2/4 & regular bilaterally.  No significant edema, rubor or " varicosities noted.  CFT and skin temperature is normal to both lower extremities.       Neurologic: Lower extremity sensation is intact to light touch.  No evidence of weakness in the lower extremities.  No evidence of neuropathy and negative tinel sign.     Dermatologic: Skin is intact to both lower extremities without significant lesions, rash or abrasion.  Normal texture turgor and tone. No paronychia or evidence of soft tissue infection is noted.    Musculoskeletal: Patient is ambulatory without assistive device or brace. Pain is noted with firm palpation along the medial band of the plantar fascia bilateral foot most notably at the origination upon the calcaneus not through the arch.  No pain with compression of the calcaneus medial to lateral or with palpation of the achilles, peroneal or posterior tibial tendons.  Slightly more than 0  of ankle joint dorsiflexion without crepitus or pain throughout the ankle, subtalar or midtarsal joints.  No pain or limitations throughout manual muscle strength testing plus 5/5 to all four quadrants bilateral.  No palpable edema noted.      Also a bulky prominence noted over the dorsal first metatarsocuneiform joint right foot limited motion and crepitus through range of motion through the metatarsal tarsal joints on the right foot.    Radiographs:  Weight bearing. Osteophyte noted about the plantar calcaneus consistent with plantar fasciitis. Diminished calcaneal inclination angle consistent with pes valgus. Also joint space narrowing about the metatarsal tarsal joints in the right foot.     ASSESSMENT:       ICD-10-CM    1. Plantar fascial fibromatosis M72.2 INJECTION SINGLE TENDON SHEATH/LIGAMENT     methylPREDNISolone (MEDROL DOSEPAK) 4 MG tablet     Kenalog 10 MG         []     triamcinolone acetonide (KENALOG) 10 MG/ML injection        PLAN:  Reviewed [facility] in [facility] and discussed etiology and treatment options.      Treatments:  11/17/2017  Discontinue  barefoot walking or unsupported walking in shoes without shank.  Dispensed written instructions to obtain appropriate shoe gear and/or OTC inserts.  Dispensed anterior night splint to use all night every night.  Prescription oral medrol for a short course as she cannot tolerate NSAIDs, gastric bypass history. Discussed risks.  Prescription for custom molded orthotics 11/17/2017    Follow up in 4-5 weeks     Also discussed the traumatic arthritis of the right midfoot in the deformity noted here. We discussed what to expect over time and how to treat this. Recommended a short course of oral Medrol are represcribed as well as custom molded orthotics. Reviewed appropriate shoe gear over time and discussed long-term treatment such as fusions of the midfoot.    approximately 45 minutes of time was spent with the patient, greater than 50% directly with the patient, counseling, educating, and coordinating care in regards to the above noted multiple diagnoses.     1/2/2018  Has been molded for orthotics  I obtained informed consent, discussed risks and alternative treatments, prepped with ETOH, and injected 10 mg Kenalog and lidocaine about the left plantar fascia.  Sterile dressing applied.   Refill oral dose pack to be used in 3-4 weeks if still pain at her request.  In addition to the injection and discuss potential risks with this.  Recommended that she wait 3 or 4 weeks before utilizing the oral Medrol refilled today.  discussed risks with injected and orals at same time.       Jermaine Gallego DPM

## 2018-02-13 ENCOUNTER — OFFICE VISIT (OUTPATIENT)
Dept: PODIATRY | Facility: OTHER | Age: 43
End: 2018-02-13
Payer: COMMERCIAL

## 2018-02-13 VITALS — BODY MASS INDEX: 36.96 KG/M2 | HEIGHT: 66 IN | WEIGHT: 230 LBS | TEMPERATURE: 98 F

## 2018-02-13 DIAGNOSIS — M72.2 PLANTAR FASCIAL FIBROMATOSIS: Primary | ICD-10-CM

## 2018-02-13 DIAGNOSIS — M12.571 TRAUMATIC ARTHRITIS OF RIGHT FOOT: ICD-10-CM

## 2018-02-13 PROCEDURE — 99213 OFFICE O/P EST LOW 20 MIN: CPT | Performed by: PODIATRIST

## 2018-02-13 ASSESSMENT — PAIN SCALES - GENERAL: PAINLEVEL: SEVERE PAIN (6)

## 2018-02-13 NOTE — MR AVS SNAPSHOT
"              After Visit Summary   2/13/2018    Padmaja Gao    MRN: [id number]           Patient Information     Date Of Birth          1975        Visit Information        Provider Department      2/13/2018 3:15 PM Jermaine Gallego DPM [facility]        Today's Diagnoses     Plantar fascial fibromatosis    -  1    Traumatic arthritis of right foot          Care Instructions    Continue orthotics no barefoot walking flexible flimsy shoes.  Follow-up in the next month or 2 with any continued restrictions otherwise as needed.          Follow-ups after your visit        Who to contact     If you have questions or need follow up information about today's clinic visit or your schedule please contact [facility] directly at [phone].  Normal or non-critical lab and imaging results will be communicated to you by ownCloudhart, letter or phone within 4 business days after the clinic has received the results. If you do not hear from us within 7 days, please contact the clinic through ownCloudhart or phone. If you have a critical or abnormal lab result, we will notify you by phone as soon as possible.  Submit refill requests through Share Your Brain or call your pharmacy and they will forward the refill request to us. Please allow 3 business days for your refill to be completed.          Additional Information About Your Visit        ownCloudhart Information     Share Your Brain lets you send messages to your doctor, view your test results, renew your prescriptions, schedule appointments and more. To sign up, go to [email or web address] . Click on \"Log in\" on the left side of the screen, which will take you to the Welcome page. Then click on \"Sign up Now\" on the right side of the page.     You will be asked to enter the access code listed below, as well as some personal information. Please follow the directions to create your username and password.     Your access code is: [email or web address]  Expires: 2/15/2018  " "9:47 AM     Your access code will  in 90 days. If you need help or a new code, please call your [facility] or [phone].        Care EveryWhere ID     This is your Care EveryWhere ID. This could be used by other organizations to access your [place] medical records  [id number]        Your Vitals Were     Temperature Height BMI (Body Mass Index)             98  F (36.7  C) (Temporal) 5' 6\" (1.676 m) 37.12 kg/m2          Blood Pressure from Last 3 Encounters:   17 102/67   12/11/15 113/72   14 116/70    Weight from Last 3 Encounters:   18 230 lb (104.3 kg)   18 236 lb (107 kg)   17 234 lb (106.1 kg)              Today, you had the following     No orders found for display       Primary Care Provider Office Phone # Fax #    [facility] [phone]       [address] [address]        Equal Access to Services     PILI [facility] HospitalEDMUNDO : Hadii olga ku hadasho Soomaali, waaxda luqadaha, qaybta kaalmada adeegyada, maddy novak . So [facility] [phone].    ATENCIÓN: Si habla español, tiene a vu disposición servicios gratuitos de asistencia lingüística. Llame al [phone].    We comply with applicable federal civil rights laws and [place] laws. We do not discriminate on the basis of race, color, national origin, age, disability, sex, sexual orientation, or gender identity.            Thank you!     Thank you for choosing [facility]  for your care. Our goal is always to provide you with excellent care. Hearing back from our patients is one way we can continue to improve our services. Please take a few minutes to complete the written survey that you may receive in the mail after your visit with us. Thank you!             Your Updated Medication List - Protect others around you: Learn how to safely use, store and throw away your medicines at www.disposemymeds.org.          This list is accurate as of 18  4:06 PM. "  Always use your most recent med list.                   Brand Name Dispense Instructions for use Diagnosis    amoxicillin 500 MG capsule    AMOXIL    60 capsule    Take 2 capsules (1,000 mg) by mouth 3 times daily    Other sinusitis       methylPREDNISolone 4 MG tablet    MEDROL DOSEPAK    21 tablet    follow package directions    Plantar fascial fibromatosis       MIRENA (52 MG) 20 MCG/24HR IUD   Generic drug:  levonorgestrel      1 each by Intrauterine route once.        TYLENOL PO      Take 650 mg by mouth

## 2018-02-13 NOTE — NURSING NOTE
"Chief Complaint   Patient presents with     RECHECK     wearing NS, orthotics 1/5/2018, different shoes, pain 6,  - Left heel plantar fasciitis, onset Sept 2017; LOV 1/2/2018     Consult     posterior Right achilles pain and lateral Left foot pain; new issue       Initial Temp 98  F (36.7  C) (Temporal)  Ht 5' 6\" (1.676 m)  Wt 230 lb (104.3 kg)  BMI 37.12 kg/m2 Estimated body mass index is 37.12 kg/(m^2) as calculated from the following:    Height as of this encounter: 5' 6\" (1.676 m).    Weight as of this encounter: 230 lb (104.3 kg).  BP completed using cuff size: NA (Not Taken)  Medication Reconciliation: complete    Aby Valentine CMA, February 13, 2018    "

## 2018-02-13 NOTE — PATIENT INSTRUCTIONS
Continue orthotics no barefoot walking flexible flimsy shoes.  Follow-up in the next month or 2 with any continued restrictions otherwise as needed.

## 2018-02-13 NOTE — PROGRESS NOTES
"Chief Complaint   Patient presents with     RECHECK     wearing NS, orthotics 1/5/2018, different shoes, pain 6,  - Left heel plantar fasciitis, onset Sept 2017; LOV 1/2/2018     Consult     posterior Right achilles pain and lateral Left foot pain; new issue       Weight management plan: Patient was referred to their PCP to discuss a diet and exercise plan.     New issue after obtaining orthotics, posterior Right heel pain that is intermittent shooting, burning, stabbing pain 6, lateral Left ankle pain.     HPI:  Padmaja Gao is a 42 year old female who is seen in consultation at the request of self.    Pt presents for eval of:   (Onset, Location, L/R, Character, Treatments, Injury if yes)    XR Right foot and Left calcaneal today, 11/17/2017 Sept 2017 plantar Left heel pain. About a year ago Right heel but this is intermittent now.  Sharp, stabbing, burning, shooting, throbbing, tightness after lying or sitting, pain 5-10  Right dorsal foot pain since fracture when patient was a teenager.  Massage, ice, stretching, OTC inserts    Works at [employer], .     ROS: 10 point ROS neg other than the symptoms noted above in the HPI.    PAST MEDICAL HISTORY:   Past Medical History:   Diagnosis Date     Cholelithiasis      GERD (gastroesophageal reflux disease)      Nephrolithiasis      Obesity      KARL (obstructive sleep apnea)-mild (AHI 7) 7/15/2013    Indications for Polysomnogram 7/10/2013: The patient is a 37 y year old Female who is 5' 5\" and weighs 277.1 lbs.  Her BMI equals 46.2, [place] sleepiness scale 15.0. A full night polysomnogram was performed to evaluate for excessive sleepiness, snoring, and possible KARL  Polysomnogram Data:  A full night polysomnogram recorded the standard physiologic parameters including EEG, EOG, EMG, EKG, nasa     Seasonal allergies      PAST SURGICAL HISTORY:   Past Surgical History:   Procedure Laterality Date     EXTRACORPOREAL SHOCK WAVE LITHOTRIPSY " "(ESWL)       LAPAROSCOPIC GASTRIC SLEEVE  12/5/2013    Procedure: LAPAROSCOPIC GASTRIC SLEEVE;  LAPAROSCOPIC SLEEVE GASTRECTOMY ;  Surgeon: Roni Pina MD;  Location:  OR     LITHOTRIPSY       [facility]      x1     MEDICATIONS:   Current Outpatient Prescriptions:      Acetaminophen (TYLENOL PO), Take 650 mg by mouth, Disp: , Rfl:      levonorgestrel (MIRENA) 20 MCG/24HR IUD, 1 each by Intrauterine route once., Disp: , Rfl:      methylPREDNISolone (MEDROL DOSEPAK) 4 MG tablet, follow package directions, Disp: 21 tablet, Rfl: 0     amoxicillin (AMOXIL) 500 MG capsule, Take 2 capsules (1,000 mg) by mouth 3 times daily, Disp: 60 capsule, Rfl: 0  ALLERGIES:    Allergies   Allergen Reactions     Sulfa Drugs Other (See Comments)     Unknown      SOCIAL HISTORY:   Social History     Social History     Marital status:      Spouse name: N/A     Number of children: N/A     Years of education: N/A     Occupational History     Not on file.     Social History Main Topics     Smoking status: Never Smoker     Smokeless tobacco: Never Used     Alcohol use Yes     Drug use: No     Sexual activity: Yes     Partners: Male     Other Topics Concern     Caffeine Concern No     stopped secondary top surgery      Sleep Concern Yes     Weight Concern Yes     lap band      Social History Narrative     FAMILY HISTORY:   Family History   Problem Relation Age of Onset     Other - See Comments Paternal Grandmother      KARL     Hypertension Father        EXAM:Vitals: Temp 98  F (36.7  C) (Temporal)  Ht 5' 6\" (1.676 m)  Wt 230 lb (104.3 kg)  BMI 37.12 kg/m2  BMI= Body mass index is 37.12 kg/(m^2).    General appearance: Patient is alert and fully cooperative with history & exam.  No sign of distress is noted during the visit.     Psychiatric: Affect is pleasant & appropriate.  Patient appears motivated to improve health.     Respiratory: Breathing is regular & unlabored while sitting.     HEENT: Hearing is intact to spoken " word.  Speech is clear.  No gross evidence of visual impairment that would impact ambulation.     Vascular: DP & PT 2/4 & regular bilaterally.  No significant edema, rubor or varicosities noted.  CFT and skin temperature is normal to both lower extremities.       Neurologic: Lower extremity sensation is intact to light touch.  No evidence of weakness in the lower extremities.  No evidence of neuropathy and negative tinel sign.     Dermatologic: Skin is intact to both lower extremities without significant lesions, rash or abrasion.  Normal texture turgor and tone. No paronychia or evidence of soft tissue infection is noted.    Musculoskeletal: Patient is ambulatory without assistive device or brace.  All discomfort about the medial band of the plantar fascia and calcaneus is resolved at this time.  No palpable edema or dysfunction throughout the Achilles peroneal or posterior tibial tendons or the ankle subtalar or midtarsal joints.  There is bony prominence about the dorsal medial midfoot first metatarsal cuneiform and navicular joint however no pain with direct palpation or range of motion.    Also a bulky prominence noted over the dorsal first metatarsocuneiform joint right foot limited motion and crepitus through range of motion through the metatarsal tarsal joints on the right foot.    Radiographs:  Weight bearing. Osteophyte noted about the plantar calcaneus consistent with plantar fasciitis. Diminished calcaneal inclination angle consistent with pes valgus. Also joint space narrowing about the metatarsal tarsal joints in the right foot.     ASSESSMENT:       ICD-10-CM    1. Plantar fascial fibromatosis M72.2    2. 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      Treatments:  11/17/2017  Discontinue barefoot walking or unsupported walking in shoes without shank.  Dispensed written instructions to obtain appropriate shoe  gear and/or OTC inserts.  Dispensed anterior night splint to use all night every night.  Prescription oral medrol for a short course as she cannot tolerate NSAIDs, gastric bypass history. Discussed risks.  Prescription for custom molded orthotics 11/17/2017    Follow up in 4-5 weeks     Also discussed the traumatic arthritis of the right midfoot in the deformity noted here. We discussed what to expect over time and how to treat this. Recommended a short course of oral Medrol are represcribed as well as custom molded orthotics. Reviewed appropriate shoe gear over time and discussed long-term treatment such as fusions of the midfoot.    approximately 45 minutes of time was spent with the patient, greater than 50% directly with the patient, counseling, educating, and coordinating care in regards to the above noted multiple diagnoses.     1/2/2018  Has been molded for orthotics  I obtained informed consent, discussed risks and alternative treatments, prepped with ETOH, and injected 10 mg Kenalog and lidocaine about the left plantar fascia.  Sterile dressing applied.   Refill oral dose pack to be used in 3-4 weeks if still pain at her request.  In addition to the injection and discuss potential risks with this.  Recommended that she wait 3 or 4 weeks before utilizing the oral Medrol refilled today.  discussed risks with injected and orals at same time.     2/13/2018  Had injection last time but no more oral pred  She is quite pleased with her outcome at this point with reduced symptoms.  She does have some occasional off and on discomfort about her right Achilles tendon and left peroneal tendons but this is not every day and not very consistent.  She has obtained new shoes including nonslip shoes for the kitchen.  She is quite pleased with her function at this point and has no limitations.  Recommended continue orthotics and appropriate shoe gear at all times and follow-up in the next couple of months if her symptoms  return.    Jermaine Gallego DPM

## 2018-03-06 ENCOUNTER — OFFICE VISIT (OUTPATIENT)
Dept: FAMILY MEDICINE | Facility: CLINIC | Age: 43
End: 2018-03-06
Payer: COMMERCIAL

## 2018-03-06 VITALS
SYSTOLIC BLOOD PRESSURE: 116 MMHG | HEART RATE: 74 BPM | RESPIRATION RATE: 18 BRPM | TEMPERATURE: 97.4 F | DIASTOLIC BLOOD PRESSURE: 76 MMHG | WEIGHT: 237 LBS | BODY MASS INDEX: 38.25 KG/M2 | OXYGEN SATURATION: 99 %

## 2018-03-06 DIAGNOSIS — J32.9 VIRAL SINUSITIS: Primary | ICD-10-CM

## 2018-03-06 DIAGNOSIS — B97.89 VIRAL SINUSITIS: Primary | ICD-10-CM

## 2018-03-06 PROCEDURE — 99213 OFFICE O/P EST LOW 20 MIN: CPT | Performed by: PHYSICIAN ASSISTANT

## 2018-03-06 ASSESSMENT — PAIN SCALES - GENERAL: PAINLEVEL: NO PAIN (0)

## 2018-03-06 NOTE — PATIENT INSTRUCTIONS
Sinusitis (No Antibiotics)    The sinuses are air-filled spaces within the bones of the face. They connect to the inside of the nose. Sinusitis is an inflammation of the tissue lining the sinus cavity. Sinus inflammation can occur during a cold. It can also be due to allergies to pollens and other particles in the air. It can cause symptoms such as sinus congestion, headache, sore throat, facial swelling and fullness. It may also cause a low-grade fever. No infection is present, and no antibiotic treatment is needed.  Home care    Drink plenty of water, hot tea, and other liquids. This may help thin mucus. It also may promote sinus drainage.    Heat may help soothe painful areas of the face. Use a towel soaked in hot water. Or,  the shower and direct the hot spray onto your face. Using a vaporizer along with a menthol rub at night may also help.     An expectorant containing guaifenesin may help thin the mucus and promote drainage from the sinuses.    Over-the-counter decongestants may be used unless a similar medicine was prescribed. Nasal sprays work the fastest. Use one that contains phenylephrine or oxymetazoline. First blow the nose gently. Then use the spray. Do not use these medicines more often than directed on the label or symptoms may get worse. You may also use tablets containing pseudoephedrine. Avoid products that combine ingredients, because side effects may be increased. Read labels. You can also ask the pharmacist for help. (NOTE: Persons with high blood pressure should not use decongestants. They can raise blood pressure.)    Over-the-counter antihistamines may help if allergies contributed to your sinusitis.      Use acetaminophen or ibuprofen to control pain, unless another pain medicine was prescribed. (If you have chronic liver or kidney disease or ever had a stomach ulcer, talk with your doctor before using these medicines. Aspirin should never be used in anyone under 18 years of age  who is ill with a fever. It may cause severe liver damage.)    Use nasal rinses or irrigation as instructed by your health care provider.    Don't smoke. This can worsen symptoms.  Follow-up care  Follow up with your healthcare provider or our staff if you are not improving within the next week.  When to seek medical advice  Call your healthcare provider if any of these occur:    Green or yellow discharge from the nose or into the throat    Facial pain or headache becoming more severe    Stiff neck    Unusual drowsiness or confusion    Swelling of the forehead or eyelids    Vision problems, including blurred or double vision    Fever of 100.4 F (38 C) or higher, or as directed by your healthcare provider    Seizure    Breathing problems    Symptoms not resolving within 10 days  Date Last Reviewed: 4/13/2015 2000-2017 [employer]. [address] [place] [address], [place] [address]. All rights reserved. This information is not intended as a substitute for professional medical care. Always follow your healthcare professional's instructions.        Upper respiratory infections are usually caused by viruses and, sometimes certain bacteria.  Antibiotics don't help the vast majority of people recover any quicker even when caused by a bacteria.  The body will fight this infection but it needs to be treated well in order to help heal itself.  Rest as needed.  It is ok to reduce food intake if appetite is poor but it is quite important to maintain/increase fluid intake.    For cough, dextromethorphan/guaifenesin combinations help loosen secretions and suppress cough safely without significant risk of sedation. Often 2 puffs four times daily of an albuterol inhaler will help with bronchitis.  This is a prescription medicine.    For nasal congestion and sinus pressure, pseudoephedrine (Sudafed) or phenylephrine is often helpful but it can cause elevations in blood pressure and insomnia.  Short courses of a nasal  decongestant spray (Afrin or Neosinephrine) can be appropriate but their use should be restricted to 3 days due to the high risk of nasal addiction.    For pain and fevers, acetaminophen (Tylenol) is most appropriate.  Ibuprofen (Advil) or naproxen (Aleve) are useful too and last longer but they can cause elevation of blood pressure or stomach problems.    Antihistamines (Benadryl, Dimetapp, etc.) cause sedation, confusion, bowel and urinary abnormalities and are of little use for infectious causes of cough and nasal congestion.  Their use should be reserved for allergic symptoms.

## 2018-03-06 NOTE — PROGRESS NOTES
SUBJECTIVE:   Padmaja Gao is a 42 year old female who presents to clinic today for the following health issues:    Padmaja has been ill for 4 days. She feels sinus pain and pressure. No fever.     ENT Symptoms             Symptoms: cc Present Absent Comment   Fever/Chills   x    Fatigue   x    Muscle Aches   x A little   Eye Irritation   x    Sneezing  x     Nasal Michael/Drg  x     Sinus Pressure/Pain  x     Loss of smell  x     Dental pain   x    Sore Throat   x    Swollen Glands   x    Ear Pain/Fullness  x     Cough   x A little possible due to drainage    Wheeze   x    Chest Pain   x    Shortness of breath   x    Rash   x    Other  x  headaches     Symptom duration:  4 days   Symptom severity:  severe   Treatments tried:  tylenol and otc sinus meds   Contacts:  n/a           Problem list and histories reviewed & adjusted, as indicated.  Additional history: as documented    Patient Active Problem List   Diagnosis     Morbid obesity (H)     Dyspnea and respiratory abnormality     Esophageal reflux     Low back pain     Intertrigo     KARL (obstructive sleep apnea)-mild (AHI 7)     Past Surgical History:   Procedure Laterality Date     EXTRACORPOREAL SHOCK WAVE LITHOTRIPSY (ESWL)       LAPAROSCOPIC GASTRIC SLEEVE  12/5/2013    Procedure: LAPAROSCOPIC GASTRIC SLEEVE;  LAPAROSCOPIC SLEEVE GASTRECTOMY ;  Surgeon: Roni Pina MD;  Location:  OR     LITHOTRIPSY       [facility] access hospital      x1       Social History   Substance Use Topics     Smoking status: Never Smoker     Smokeless tobacco: Never Used     Alcohol use Yes     Family History   Problem Relation Age of Onset     Other - See Comments Paternal Grandmother      KARL     Hypertension Father          Current Outpatient Prescriptions   Medication Sig Dispense Refill     Acetaminophen (TYLENOL PO) Take 650 mg by mouth       levonorgestrel (MIRENA) 20 MCG/24HR IUD 1 each by Intrauterine route once.       Allergies   Allergen Reactions     Sulfa Drugs Other  (See Comments)     Unknown        Reviewed and updated as needed this visit by clinical staff       Reviewed and updated as needed this visit by Provider         ROS:  Constitutional, HEENT, cardiovascular, pulmonary, GI, , musculoskeletal, neuro, skin, endocrine and psych systems are negative, except as otherwise noted.    OBJECTIVE:     /76  Pulse 74  Temp 97.4  F (36.3  C) (Oral)  Resp 18  Wt 237 lb (107.5 kg)  SpO2 99%  BMI 38.25 kg/m2  Body mass index is 38.25 kg/(m^2).  GENERAL: healthy, alert and no distress  EYES: Eyes grossly normal to inspection, PERRL and conjunctivae and sclerae normal  HENT: ear canals and TM's normal, nose and mouth without ulcers or lesions  NECK: no adenopathy, no asymmetry, masses, or scars and thyroid normal to palpation  RESP: lungs clear to auscultation - no rales, rhonchi or wheezes  CV: regular rate and rhythm, normal S1 S2, no S3 or S4, no murmur, click or rub, no peripheral edema and peripheral pulses strong  MS: no gross musculoskeletal defects noted, no edema  SKIN: no suspicious lesions or rashes  NEURO: Normal strength and tone, mentation intact and speech normal  PSYCH: mentation appears normal, affect normal/bright    Diagnostic Test Results:  none     ASSESSMENT/PLAN:       1. Viral sinusitis  OTC medication discussed.   Handout also given.   She will contact the clinic if symptoms worsen or persist beyond 10 days.   I will send a prescription for augmentin at that time.         Kristen M. Kehr, PA-C  [facility]

## 2018-03-06 NOTE — MR AVS SNAPSHOT
After Visit Summary   3/6/2018    Padmaja Gao    MRN: [id number]           Patient Information     Date Of Birth          1975        Visit Information        Provider Department      3/6/2018 8:40 AM Kehr, Kristen M, PA-C [facility]        Today's Diagnoses     Screening for malignant neoplasm of cervix    -  1      Care Instructions      Sinusitis (No Antibiotics)    The sinuses are air-filled spaces within the bones of the face. They connect to the inside of the nose. Sinusitis is an inflammation of the tissue lining the sinus cavity. Sinus inflammation can occur during a cold. It can also be due to allergies to pollens and other particles in the air. It can cause symptoms such as sinus congestion, headache, sore throat, facial swelling and fullness. It may also cause a low-grade fever. No infection is present, and no antibiotic treatment is needed.  Home care    Drink plenty of water, hot tea, and other liquids. This may help thin mucus. It also may promote sinus drainage.    Heat may help soothe painful areas of the face. Use a towel soaked in hot water. Or,  the shower and direct the hot spray onto your face. Using a vaporizer along with a menthol rub at night may also help.     An expectorant containing guaifenesin may help thin the mucus and promote drainage from the sinuses.    Over-the-counter decongestants may be used unless a similar medicine was prescribed. Nasal sprays work the fastest. Use one that contains phenylephrine or oxymetazoline. First blow the nose gently. Then use the spray. Do not use these medicines more often than directed on the label or symptoms may get worse. You may also use tablets containing pseudoephedrine. Avoid products that combine ingredients, because side effects may be increased. Read labels. You can also ask the pharmacist for help. (NOTE: Persons with high blood pressure should not use decongestants. They can raise blood  pressure.)    Over-the-counter antihistamines may help if allergies contributed to your sinusitis.      Use acetaminophen or ibuprofen to control pain, unless another pain medicine was prescribed. (If you have chronic liver or kidney disease or ever had a stomach ulcer, talk with your doctor before using these medicines. Aspirin should never be used in anyone under 18 years of age who is ill with a fever. It may cause severe liver damage.)    Use nasal rinses or irrigation as instructed by your health care provider.    Don't smoke. This can worsen symptoms.  Follow-up care  Follow up with your healthcare provider or our staff if you are not improving within the next week.  When to seek medical advice  Call your healthcare provider if any of these occur:    Green or yellow discharge from the nose or into the throat    Facial pain or headache becoming more severe    Stiff neck    Unusual drowsiness or confusion    Swelling of the forehead or eyelids    Vision problems, including blurred or double vision    Fever of 100.4 F (38 C) or higher, or as directed by your healthcare provider    Seizure    Breathing problems    Symptoms not resolving within 10 days  Date Last Reviewed: 4/13/2015 2000-2017 [employer]. [address] [place] [address]. All rights reserved. This information is not intended as a substitute for professional medical care. Always follow your healthcare professional's instructions.        Upper respiratory infections are usually caused by viruses and, sometimes certain bacteria.  Antibiotics don't help the vast majority of people recover any quicker even when caused by a bacteria.  The body will fight this infection but it needs to be treated well in order to help heal itself.  Rest as needed.  It is ok to reduce food intake if appetite is poor but it is quite important to maintain/increase fluid intake.    For cough, dextromethorphan/guaifenesin combinations help loosen  secretions and suppress cough safely without significant risk of sedation. Often 2 puffs four times daily of an albuterol inhaler will help with bronchitis.  This is a prescription medicine.    For nasal congestion and sinus pressure, pseudoephedrine (Sudafed) or phenylephrine is often helpful but it can cause elevations in blood pressure and insomnia.  Short courses of a nasal decongestant spray (Afrin or Neosinephrine) can be appropriate but their use should be restricted to 3 days due to the high risk of nasal addiction.    For pain and fevers, acetaminophen (Tylenol) is most appropriate.  Ibuprofen (Advil) or naproxen (Aleve) are useful too and last longer but they can cause elevation of blood pressure or stomach problems.    Antihistamines (Benadryl, Dimetapp, etc.) cause sedation, confusion, bowel and urinary abnormalities and are of little use for infectious causes of cough and nasal congestion.  Their use should be reserved for allergic symptoms.                  Follow-ups after your visit        Who to contact     If you have questions or need follow up information about today's clinic visit or your schedule please contact [facility] directly at [phone].  Normal or non-critical lab and imaging results will be communicated to you by Kaboodlehart, letter or phone within 4 business days after the clinic has received the results. If you do not hear from us within 7 days, please contact the clinic through Kaboodlehart or phone. If you have a critical or abnormal lab result, we will notify you by phone as soon as possible.  Submit refill requests through Precog or call your pharmacy and they will forward the refill request to us. Please allow 3 business days for your refill to be completed.          Additional Information About Your Visit        Precog Information     Precog lets you send messages to your doctor, view your test results, renew your prescriptions, schedule appointments and more. To  "sign up, go to [email or web address] . Click on \"Log in\" on the left side of the screen, which will take you to the Welcome page. Then click on \"Sign up Now\" on the right side of the page.     You will be asked to enter the access code listed below, as well as some personal information. Please follow the directions to create your username and password.     Your access code is: [email or web address]  Expires: 2018  8:55 AM     Your access code will  in 90 days. If you need help or a new code, please call your [facility] clinic or [phone].        Care EveryWhere ID     This is your Care EveryWhere ID. This could be used by other organizations to access your [facility] medical records  [id number]        Your Vitals Were     Pulse Temperature Respirations Pulse Oximetry BMI (Body Mass Index)       74 97.4  F (36.3  C) (Oral) 18 99% 38.25 kg/m2        Blood Pressure from Last 3 Encounters:   18 116/76   17 102/67   12/11/15 113/72    Weight from Last 3 Encounters:   18 237 lb (107.5 kg)   18 230 lb (104.3 kg)   18 236 lb (107 kg)              Today, you had the following     No orders found for display       Primary Care Provider Office Phone # Fax #    [facility] [phone]       [address] [place] [id number]        Equal Access to Services     [facility] Center[facility]MUNDO : Hadii aad ku hadasho Soomaali, waaxda luqadaha, qaybta kaalmada adeegyada, waxay vern novak . So [facility] [phone].    ATENCIÓN: Si habla español, tiene a vu disposición servicios gratuitos de asistencia lingüística. Llame al [phone].    We comply with applicable federal civil rights laws and [place] laws. We do not discriminate on the basis of race, color, national origin, age, disability, sex, sexual orientation, or gender identity.            Thank you!     Thank you for choosing [facility]  [facility]. Our goal is always to provide you with excellent " care. Hearing back from our patients is one way we can continue to improve our services. Please take a few minutes to complete the written survey that you may receive in the mail after your visit with us. Thank you!             Your Updated Medication List - Protect others around you: Learn how to safely use, store and throw away your medicines at www.disposemymeds.org.          This list is accurate as of 3/6/18  8:56 AM.  Always use your most recent med list.                   Brand Name Dispense Instructions for use Diagnosis    MIRENA (52 MG) 20 MCG/24HR IUD   Generic drug:  levonorgestrel      1 each by Intrauterine route once.        TYLENOL PO      Take 650 mg by mouth

## 2018-03-06 NOTE — NURSING NOTE
"Chief Complaint   Patient presents with     Sinus Problem     possible sinus infection       Initial /76  Pulse 74  Temp 97.4  F (36.3  C) (Oral)  Resp 18  Wt 237 lb (107.5 kg)  SpO2 99%  BMI 38.25 kg/m2 Estimated body mass index is 38.25 kg/(m^2) as calculated from the following:    Height as of 2/13/18: 5' 6\" (1.676 m).    Weight as of this encounter: 237 lb (107.5 kg).  Medication Reconciliation: complete    TALON Gay MA    "

## 2018-03-13 ENCOUNTER — TELEPHONE (OUTPATIENT)
Dept: FAMILY MEDICINE | Facility: CLINIC | Age: 43
End: 2018-03-13

## 2018-03-13 DIAGNOSIS — J01.00 ACUTE MAXILLARY SINUSITIS: Primary | ICD-10-CM

## 2018-03-13 NOTE — TELEPHONE ENCOUNTER
Reason for Call:  Other call back    Detailed comments: Patient was seen on Tuesday 3/6/18 with Kehr, Kristen and was told to take some over the counter medication but symptoms are coming back and was told if they did she could get a prescription for some medication. Please call her to let her know if this can be sent.     Phone Number Patient can be reached at: Cell number on file:    Telephone Information:   Mobile [phone]       Best Time: Any    Can we leave a detailed message on this number? YES    Call taken on 3/13/2018 at 7:09 AM by Milagro Ross

## 2018-03-13 NOTE — TELEPHONE ENCOUNTER
Information below is reviewed with  Kristen Kehr, PA-C, Verbal Orders will treat with Augmentin 875 2 times daily x 10 days.   Continue with pushing fluids, nasal saline rinse.   The patient/parent agrees with the plan and verbalized good understanding.    Per protocol, will route encounter to be cosigned by provider for Verbal Orders.  Lena Mattson RN

## 2018-03-13 NOTE — TELEPHONE ENCOUNTER
Per office visit notes from 3/6/18 with Kristen Kehr, PA-C if symptoms went longer then 10 days patient was to contact clinic for treatment.   Day 11 of Symptoms which are the same in addition to a temp 99.6/or.  Slight sore throat, believes this is related to PND, improves with fluids.  +cough with PND.  Sinus pressure/pain.    Please advise  Lena Mattson RN

## 2018-04-03 ENCOUNTER — TELEPHONE (OUTPATIENT)
Dept: FAMILY MEDICINE | Facility: CLINIC | Age: 43
End: 2018-04-03

## 2018-04-03 NOTE — TELEPHONE ENCOUNTER
Panel Management Review      Patient has the following on her problem list: None      Composite cancer screening  Chart review shows that this patient is due/due soon for the following Pap Smear  Summary:    Patient is due/failing the following:   PAP    Action needed:   Patient needs office visit for annual female exam.    Type of outreach:    Sent letter.    Questions for provider review:    None                                                                                                                                    Kd Herrera CMA       Chart routed to closed .

## 2018-04-03 NOTE — LETTER
[facility]  [address] [facility] [address]  Phone: [phone]    04/03/18    Padmaja Gao  [address] [place] [address]      To whom it may concern:     Our records indicate that you have not scheduled for a(n)annual female exam which was recommended by your health care team. Monitoring and managing your preventative and chronic health conditions are very important to us.     If you have received your health care elsewhere, please provide us with that information so it can be documented in your chart.    Please call [phone] or message us through your "[employer] account to schedule an appointment or provide information for your chart.     I look forward to seeing you and working with you on your health care needs.       *If you have already scheduled an appointment, please disregard this reminder      Sincerely,      Esa Holley PA-C/marilin

## 2019-05-30 ENCOUNTER — TELEPHONE (OUTPATIENT)
Dept: PODIATRY | Facility: CLINIC | Age: 44
End: 2019-05-30

## 2019-05-30 NOTE — TELEPHONE ENCOUNTER
Reason for Call:  Other call back    Detailed comments: Pt would like another order for her orthotics. Please call her and let her know.     Phone Number Patient can be reached at: Home number on file [phone] (home)    Best Time: NA    Can we leave a detailed message on this number? YES    Call taken on 5/30/2019 at 3:38 PM by Zeynep Olivarez

## 2019-05-30 NOTE — TELEPHONE ENCOUNTER
Route to Specialty Scheduling -     Please call patient to schedule a follow up appointment to discuss new orthotic order. Patient has not been seen since March 2018. Aby Valentine CMA, May 30, 2019

## 2023-07-14 ENCOUNTER — LAB REQUISITION (OUTPATIENT)
Dept: LAB | Facility: CLINIC | Age: 48
End: 2023-07-14

## 2023-07-14 DIAGNOSIS — Z12.4 ENCOUNTER FOR SCREENING FOR MALIGNANT NEOPLASM OF CERVIX: ICD-10-CM

## 2023-07-14 PROCEDURE — 87624 HPV HI-RISK TYP POOLED RSLT: CPT | Performed by: OBSTETRICS & GYNECOLOGY

## 2023-07-14 PROCEDURE — G0145 SCR C/V CYTO,THINLAYER,RESCR: HCPCS | Performed by: OBSTETRICS & GYNECOLOGY

## 2023-07-19 LAB
BKR LAB AP GYN ADEQUACY: NORMAL
BKR LAB AP GYN INTERPRETATION: NORMAL
BKR LAB AP HPV REFLEX: NORMAL
BKR LAB AP LMP: NORMAL
BKR LAB AP PREVIOUS ABNL DX: NORMAL
BKR LAB AP PREVIOUS ABNORMAL: NORMAL
PATH REPORT.COMMENTS IMP SPEC: NORMAL
PATH REPORT.COMMENTS IMP SPEC: NORMAL
PATH REPORT.RELEVANT HX SPEC: NORMAL

## 2023-07-21 LAB
HUMAN PAPILLOMA VIRUS 16 DNA: NEGATIVE
HUMAN PAPILLOMA VIRUS 18 DNA: NEGATIVE
HUMAN PAPILLOMA VIRUS FINAL DIAGNOSIS: NORMAL
HUMAN PAPILLOMA VIRUS OTHER HR: NEGATIVE